# Patient Record
(demographics unavailable — no encounter records)

---

## 2024-10-16 NOTE — HISTORY OF PRESENT ILLNESS
[FreeTextEntry1] : 62yo G P LMP___referred by [facility] ER for PMB and fibroids.   Pt presented to [facility] ER on  with pain with urination as well as hematuria. Notes she has a history of fibroids but believes that her bleeding is secondary to a urinary tract infection. States that she has been having pain along the lower belly, has had a UTI in the past notes that this feels very similar.   Pelvic US-  Health maintenance:  Pap smear- Mammo-  Colonoscopy-  DEXA-

## 2024-10-16 NOTE — CHIEF COMPLAINT
[FreeTextEntry1] : [facility] [phone] at [address] [place] [address]